# Patient Record
Sex: MALE | Race: WHITE | NOT HISPANIC OR LATINO | Employment: OTHER | URBAN - METROPOLITAN AREA
[De-identification: names, ages, dates, MRNs, and addresses within clinical notes are randomized per-mention and may not be internally consistent; named-entity substitution may affect disease eponyms.]

---

## 2022-09-14 ENCOUNTER — APPOINTMENT (OUTPATIENT)
Dept: GENERAL RADIOLOGY | Facility: HOSPITAL | Age: 77
End: 2022-09-14

## 2022-09-14 ENCOUNTER — HOSPITAL ENCOUNTER (EMERGENCY)
Facility: HOSPITAL | Age: 77
Discharge: HOME | End: 2022-09-14
Attending: EMERGENCY MEDICINE | Admitting: EMERGENCY MEDICINE

## 2022-09-14 VITALS
HEART RATE: 64 BPM | HEIGHT: 69 IN | TEMPERATURE: 98 F | WEIGHT: 205 LBS | RESPIRATION RATE: 16 BRPM | DIASTOLIC BLOOD PRESSURE: 80 MMHG | BODY MASS INDEX: 30.36 KG/M2 | SYSTOLIC BLOOD PRESSURE: 117 MMHG | OXYGEN SATURATION: 96 %

## 2022-09-14 DIAGNOSIS — I47.20 VENTRICULAR TACHYCARDIA: ICD-10-CM

## 2022-09-14 DIAGNOSIS — R04.0 EPISTAXIS: Primary | ICD-10-CM

## 2022-09-14 LAB
ALBUMIN SERPL-MCNC: 3.9 G/DL (ref 3.5–5.2)
ALBUMIN/GLOB SERPL: 1.3 G/DL
ALP SERPL-CCNC: 89 U/L (ref 39–117)
ALT SERPL W P-5'-P-CCNC: 14 U/L (ref 1–41)
ANION GAP SERPL CALCULATED.3IONS-SCNC: 7.7 MMOL/L (ref 5–15)
AST SERPL-CCNC: 22 U/L (ref 1–40)
BASOPHILS # BLD AUTO: 0.05 10*3/MM3 (ref 0–0.2)
BASOPHILS NFR BLD AUTO: 0.6 % (ref 0–1.5)
BILIRUB SERPL-MCNC: 0.5 MG/DL (ref 0–1.2)
BUN SERPL-MCNC: 20 MG/DL (ref 8–23)
BUN/CREAT SERPL: 14.9 (ref 7–25)
CALCIUM SPEC-SCNC: 8.7 MG/DL (ref 8.6–10.5)
CHLORIDE SERPL-SCNC: 103 MMOL/L (ref 98–107)
CO2 SERPL-SCNC: 26.3 MMOL/L (ref 22–29)
CREAT SERPL-MCNC: 1.34 MG/DL (ref 0.76–1.27)
DEPRECATED RDW RBC AUTO: 43.4 FL (ref 37–54)
EGFRCR SERPLBLD CKD-EPI 2021: 54.9 ML/MIN/1.73
EOSINOPHIL # BLD AUTO: 0.04 10*3/MM3 (ref 0–0.4)
EOSINOPHIL NFR BLD AUTO: 0.5 % (ref 0.3–6.2)
ERYTHROCYTE [DISTWIDTH] IN BLOOD BY AUTOMATED COUNT: 13.1 % (ref 12.3–15.4)
GLOBULIN UR ELPH-MCNC: 2.9 GM/DL
GLUCOSE SERPL-MCNC: 179 MG/DL (ref 65–99)
HCT VFR BLD AUTO: 46.8 % (ref 37.5–51)
HGB BLD-MCNC: 15.7 G/DL (ref 13–17.7)
IMM GRANULOCYTES # BLD AUTO: 0.04 10*3/MM3 (ref 0–0.05)
IMM GRANULOCYTES NFR BLD AUTO: 0.5 % (ref 0–0.5)
LYMPHOCYTES # BLD AUTO: 0.94 10*3/MM3 (ref 0.7–3.1)
LYMPHOCYTES NFR BLD AUTO: 11.3 % (ref 19.6–45.3)
MCH RBC QN AUTO: 30.7 PG (ref 26.6–33)
MCHC RBC AUTO-ENTMCNC: 33.5 G/DL (ref 31.5–35.7)
MCV RBC AUTO: 91.6 FL (ref 79–97)
MONOCYTES # BLD AUTO: 0.77 10*3/MM3 (ref 0.1–0.9)
MONOCYTES NFR BLD AUTO: 9.2 % (ref 5–12)
NEUTROPHILS NFR BLD AUTO: 6.49 10*3/MM3 (ref 1.7–7)
NEUTROPHILS NFR BLD AUTO: 77.9 % (ref 42.7–76)
NRBC BLD AUTO-RTO: 0 /100 WBC (ref 0–0.2)
PLATELET # BLD AUTO: 203 10*3/MM3 (ref 140–450)
PMV BLD AUTO: 10.7 FL (ref 6–12)
POTASSIUM SERPL-SCNC: 4.4 MMOL/L (ref 3.5–5.2)
PROT SERPL-MCNC: 6.8 G/DL (ref 6–8.5)
QT INTERVAL: 451 MS
RBC # BLD AUTO: 5.11 10*6/MM3 (ref 4.14–5.8)
SODIUM SERPL-SCNC: 137 MMOL/L (ref 136–145)
WBC NRBC COR # BLD: 8.33 10*3/MM3 (ref 3.4–10.8)

## 2022-09-14 PROCEDURE — 99284 EMERGENCY DEPT VISIT MOD MDM: CPT

## 2022-09-14 PROCEDURE — 36415 COLL VENOUS BLD VENIPUNCTURE: CPT

## 2022-09-14 PROCEDURE — 93005 ELECTROCARDIOGRAM TRACING: CPT | Performed by: EMERGENCY MEDICINE

## 2022-09-14 PROCEDURE — 93010 ELECTROCARDIOGRAM REPORT: CPT | Performed by: INTERNAL MEDICINE

## 2022-09-14 PROCEDURE — 71045 X-RAY EXAM CHEST 1 VIEW: CPT

## 2022-09-14 PROCEDURE — 80053 COMPREHEN METABOLIC PANEL: CPT | Performed by: EMERGENCY MEDICINE

## 2022-09-14 PROCEDURE — 85025 COMPLETE CBC W/AUTO DIFF WBC: CPT | Performed by: EMERGENCY MEDICINE

## 2022-09-14 RX ORDER — HYDRALAZINE HYDROCHLORIDE 25 MG/1
25 TABLET, FILM COATED ORAL 3 TIMES DAILY
COMMUNITY

## 2022-09-14 RX ORDER — SODIUM CHLORIDE 0.9 % (FLUSH) 0.9 %
10 SYRINGE (ML) INJECTION AS NEEDED
Status: DISCONTINUED | OUTPATIENT
Start: 2022-09-14 | End: 2022-09-14 | Stop reason: HOSPADM

## 2022-09-14 RX ORDER — OXYMETAZOLINE HYDROCHLORIDE 0.05 G/100ML
1 SPRAY NASAL ONCE
Status: COMPLETED | OUTPATIENT
Start: 2022-09-14 | End: 2022-09-14

## 2022-09-14 RX ORDER — ISOSORBIDE DINITRATE 30 MG/1
30 TABLET ORAL
COMMUNITY

## 2022-09-14 RX ADMIN — Medication 1 SPRAY: at 09:40

## 2022-09-14 NOTE — ED NOTES
Dr Cheung office called, pacemaker staff member to call back. [facility] called,  Rep Brennen Bear to call back

## 2022-09-14 NOTE — ED PROVIDER NOTES
Subjective   PIT    History of Present Illness  Patient presents with 2 complaints.  Patient said he was sleeping in bed this morning and he felt something in his chest which felt like a heart hit.  Patient does have a pacemaker/defibrillator.  Patient denies any previous episodes.  Patient has otherwise been in good health.  Patient is having no chest pain or trouble breathing.  [facility] and he [facility] Dr. Ortega.  Patient's other complaint is a left nostril nosebleed.  1 month ago patient had to have ablation by ENT due to a years worth of nosebleeds.  Patient says he is otherwise been doing well until this morning when it started spontaneously.  Patient denies any trauma or upper respiratory infection.  Patient is on blood thinners.  Patient was able to hold pressure and keep it from stopping but then once he releases pressure it will start again.  No therapy taken today.  Multiple previous episodes.  Patient presents with 2 complaints.  Patient said he was sleeping in bed this morning and he felt something in his chest which felt like a heart hit.  Patient does have a pacemaker/defibrillator.  Patient denies any previous episodes.  Patient has otherwise been in good health.  Patient is having no chest pain or trouble breathing.  [facility] is [facility] and he sees Dr. Ortega.  Patient's other complaint is a left nostril nosebleed.  1 month ago patient had to have ablation by ENT due to a years worth of nosebleeds.  Patient says he is otherwise been doing well until this morning when it started spontaneously.  Patient denies any trauma or upper respiratory infection.  Patient is on blood thinners.  Patient was able to hold pressure and keep it from stopping but then once he releases pressure it will start again.  No therapy taken today.  Multiple previous episodes.          Review of Systems   Constitutional: Negative.    HENT: Positive for nosebleeds.    Eyes: Negative.    Respiratory: Negative.     Cardiovascular: Negative for chest pain and palpitations.   Gastrointestinal: Negative.    Musculoskeletal: Negative.    Skin: Negative.    Neurological: Negative.    Hematological: Negative.        Past Medical History:   Diagnosis Date   • Atrial fibrillation (HCC)    • Cancer (HCC)    • Hyperlipidemia    • Hypertension    • Stroke (HCC)        Allergies   Allergen Reactions   • Lisinopril Swelling       Past Surgical History:   Procedure Laterality Date   • CARDIAC ABLATION     • PACEMAKER IMPLANTATION         History reviewed. No pertinent family history.    Social History     Socioeconomic History   • Marital status:    Tobacco Use   • Smoking status: Never Smoker           Objective   Physical Exam  Constitutional:       Appearance: Normal appearance.   HENT:      Head: Normocephalic and atraumatic.      Right Ear: External ear normal.      Left Ear: External ear normal.      Nose:      Left Nostril: Epistaxis present.      Mouth/Throat:      Mouth: Mucous membranes are moist.      Pharynx: Oropharynx is clear.   Eyes:      Extraocular Movements: Extraocular movements intact.      Conjunctiva/sclera: Conjunctivae normal.      Pupils: Pupils are equal, round, and reactive to light.   Cardiovascular:      Rate and Rhythm: Normal rate and regular rhythm.      Pulses: Normal pulses.      Heart sounds: Normal heart sounds.   Pulmonary:      Effort: Pulmonary effort is normal.      Breath sounds: Normal breath sounds.   Musculoskeletal:         General: No swelling or tenderness.      Cervical back: Normal range of motion.   Lymphadenopathy:      Cervical: No cervical adenopathy.   Skin:     General: Skin is warm and dry.   Neurological:      General: No focal deficit present.      Mental Status: He is alert.         ECG 12 Lead      Date/Time: 9/14/2022 9:43 AM  Performed by: Sterling Iverson MD  Authorized by: Sterling Iverson MD   Interpreted by physician  Comments: Rate of 70, biventricular paced  rhythm, no acute ST abnormalities.  No previous EKGs for comparison.                 ED Course                                         MDM  Number of Diagnoses or Management Options  Diagnosis management comments: 1124 clamp was been removed.  Patient did receive a few sprays of Afrin and then the clamp was reapplied.  There is no active bleeding as of now.  Patient will be observed for the next 30 minutes.  Also the pacemaker report came through and patient had had a few runs that was overdrive paced but he did have a defibrillator firing around 4:00.  Patient swears that it was not until 7:00 that he felt it but there is no report of any type of activity around 7 AM.  Dr. Cochran has been paged for plan of action.  Currently waiting on a call back.  Patient remained stable without any new complaints.    1215 discussed patient with Dr. Cochran and reviewed the 6 episodes plus the 1 shock that was given.  He did not want to change patient's medications.  He understood the patient's blood work was unremarkable and he just wanted patient to see him in clinic this week or the beginning of next week.       Amount and/or Complexity of Data Reviewed  Clinical lab tests: ordered and reviewed  Tests in the radiology section of CPT®: ordered and reviewed  Independent visualization of images, tracings, or specimens: yes        Final diagnoses:   Epistaxis   Ventricular tachycardia (HCC)       ED Disposition  ED Disposition     ED Disposition   Discharge    Condition   Stable    Comment   --             Kannan Ortega MD  [address] [facility]  [place] [address]  [phone]    Schedule an appointment as soon as possible for a visit            Medication List      No changes were made to your prescriptions during this visit.          Sterling Iverson MD  09/14/22 0980       Sterling Iverson MD  09/14/22 0956       Sterling Iverson MD  09/14/22 1125       Sterling Iverson MD  09/14/22 1242

## 2022-09-14 NOTE — ED NOTES
William from Dr Ortega's office called, stated they got a recording of 18 sec vfib at 0404 this am. ATP x6 and pacing did not work which then delivered shock of 550V, dr flowers notofoed

## 2022-10-20 ENCOUNTER — HOSPITAL ENCOUNTER (EMERGENCY)
Facility: HOSPITAL | Age: 77
Discharge: HOME | End: 2022-10-20
Attending: EMERGENCY MEDICINE | Admitting: EMERGENCY MEDICINE

## 2022-10-20 ENCOUNTER — APPOINTMENT (OUTPATIENT)
Dept: GENERAL RADIOLOGY | Facility: HOSPITAL | Age: 77
End: 2022-10-20

## 2022-10-20 VITALS
TEMPERATURE: 97.3 F | HEART RATE: 60 BPM | DIASTOLIC BLOOD PRESSURE: 86 MMHG | BODY MASS INDEX: 29.77 KG/M2 | OXYGEN SATURATION: 96 % | RESPIRATION RATE: 14 BRPM | WEIGHT: 201 LBS | SYSTOLIC BLOOD PRESSURE: 137 MMHG | HEIGHT: 69 IN

## 2022-10-20 DIAGNOSIS — Z71.1 FEARED CONDITION NOT DEMONSTRATED: Primary | ICD-10-CM

## 2022-10-20 DIAGNOSIS — Z45.02 ENCOUNTER FOR ASSESSMENT OF AUTOMATIC IMPLANTABLE CARDIOVERTER-DEFIBRILLATOR (AICD): ICD-10-CM

## 2022-10-20 LAB
ALBUMIN SERPL-MCNC: 4.3 G/DL (ref 3.5–5.2)
ALBUMIN/GLOB SERPL: 1.4 G/DL
ALP SERPL-CCNC: 95 U/L (ref 39–117)
ALT SERPL W P-5'-P-CCNC: 15 U/L (ref 1–41)
ANION GAP SERPL CALCULATED.3IONS-SCNC: 11.7 MMOL/L (ref 5–15)
AST SERPL-CCNC: 19 U/L (ref 1–40)
BASOPHILS # BLD AUTO: 0.05 10*3/MM3 (ref 0–0.2)
BASOPHILS NFR BLD AUTO: 0.5 % (ref 0–1.5)
BILIRUB SERPL-MCNC: 0.6 MG/DL (ref 0–1.2)
BUN SERPL-MCNC: 20 MG/DL (ref 8–23)
BUN/CREAT SERPL: 13.8 (ref 7–25)
CALCIUM SPEC-SCNC: 9.4 MG/DL (ref 8.6–10.5)
CHLORIDE SERPL-SCNC: 100 MMOL/L (ref 98–107)
CO2 SERPL-SCNC: 27.3 MMOL/L (ref 22–29)
CREAT SERPL-MCNC: 1.45 MG/DL (ref 0.76–1.27)
DEPRECATED RDW RBC AUTO: 44.3 FL (ref 37–54)
EGFRCR SERPLBLD CKD-EPI 2021: 49.9 ML/MIN/1.73
EOSINOPHIL # BLD AUTO: 0.06 10*3/MM3 (ref 0–0.4)
EOSINOPHIL NFR BLD AUTO: 0.6 % (ref 0.3–6.2)
ERYTHROCYTE [DISTWIDTH] IN BLOOD BY AUTOMATED COUNT: 13.2 % (ref 12.3–15.4)
GLOBULIN UR ELPH-MCNC: 3 GM/DL
GLUCOSE SERPL-MCNC: 138 MG/DL (ref 65–99)
HCT VFR BLD AUTO: 48.5 % (ref 37.5–51)
HGB BLD-MCNC: 16.2 G/DL (ref 13–17.7)
IMM GRANULOCYTES # BLD AUTO: 0.02 10*3/MM3 (ref 0–0.05)
IMM GRANULOCYTES NFR BLD AUTO: 0.2 % (ref 0–0.5)
LYMPHOCYTES # BLD AUTO: 2.17 10*3/MM3 (ref 0.7–3.1)
LYMPHOCYTES NFR BLD AUTO: 20.9 % (ref 19.6–45.3)
MAGNESIUM SERPL-MCNC: 2 MG/DL (ref 1.6–2.4)
MCH RBC QN AUTO: 30.9 PG (ref 26.6–33)
MCHC RBC AUTO-ENTMCNC: 33.4 G/DL (ref 31.5–35.7)
MCV RBC AUTO: 92.6 FL (ref 79–97)
MONOCYTES # BLD AUTO: 0.88 10*3/MM3 (ref 0.1–0.9)
MONOCYTES NFR BLD AUTO: 8.5 % (ref 5–12)
NEUTROPHILS NFR BLD AUTO: 69.3 % (ref 42.7–76)
NEUTROPHILS NFR BLD AUTO: 7.19 10*3/MM3 (ref 1.7–7)
NRBC BLD AUTO-RTO: 0 /100 WBC (ref 0–0.2)
PLATELET # BLD AUTO: 200 10*3/MM3 (ref 140–450)
PMV BLD AUTO: 10.7 FL (ref 6–12)
POTASSIUM SERPL-SCNC: 3.9 MMOL/L (ref 3.5–5.2)
PROT SERPL-MCNC: 7.3 G/DL (ref 6–8.5)
QT INTERVAL: 499 MS
RBC # BLD AUTO: 5.24 10*6/MM3 (ref 4.14–5.8)
SODIUM SERPL-SCNC: 139 MMOL/L (ref 136–145)
TROPONIN T SERPL-MCNC: <0.01 NG/ML (ref 0–0.03)
TROPONIN T SERPL-MCNC: <0.01 NG/ML (ref 0–0.03)
TSH SERPL DL<=0.05 MIU/L-ACNC: 9.42 UIU/ML (ref 0.27–4.2)
WBC NRBC COR # BLD: 10.37 10*3/MM3 (ref 3.4–10.8)

## 2022-10-20 PROCEDURE — 80053 COMPREHEN METABOLIC PANEL: CPT | Performed by: EMERGENCY MEDICINE

## 2022-10-20 PROCEDURE — 99283 EMERGENCY DEPT VISIT LOW MDM: CPT

## 2022-10-20 PROCEDURE — 93010 ELECTROCARDIOGRAM REPORT: CPT | Performed by: INTERNAL MEDICINE

## 2022-10-20 PROCEDURE — 71046 X-RAY EXAM CHEST 2 VIEWS: CPT

## 2022-10-20 PROCEDURE — 84484 ASSAY OF TROPONIN QUANT: CPT | Performed by: EMERGENCY MEDICINE

## 2022-10-20 PROCEDURE — 83735 ASSAY OF MAGNESIUM: CPT | Performed by: EMERGENCY MEDICINE

## 2022-10-20 PROCEDURE — 36415 COLL VENOUS BLD VENIPUNCTURE: CPT

## 2022-10-20 PROCEDURE — 93005 ELECTROCARDIOGRAM TRACING: CPT | Performed by: EMERGENCY MEDICINE

## 2022-10-20 PROCEDURE — 85025 COMPLETE CBC W/AUTO DIFF WBC: CPT | Performed by: EMERGENCY MEDICINE

## 2022-10-20 PROCEDURE — 84443 ASSAY THYROID STIM HORMONE: CPT | Performed by: EMERGENCY MEDICINE

## 2022-10-20 RX ORDER — AMIODARONE HYDROCHLORIDE 200 MG/1
200 TABLET ORAL DAILY
COMMUNITY

## 2022-10-20 NOTE — ED PROVIDER NOTES
Subjective   History of Present Illness  77-year-old male presents after he was woken from sleep by his AICD shocked him twice in close succession.  Patient states he felt a little anxious and jittery after being woken up this way but otherwise feels fine.  Denies any chest pain or shortness of breath.  States he has some chronic back pain which is no different than his norm.  Felt well prior to going to bed tonight.  Eating and drinking normally.  Was here about a month ago for similar episode.  Reports that he followed up with cardiology recently and was started on amiodarone which she has taken for about 1 week now.  Patient is on Eliquis for history of atrial fibrillation/flutter.        Review of Systems   All other systems reviewed and are negative.      Past Medical History:   Diagnosis Date   • Atrial fibrillation (HCC)    • Cancer (HCC)    • Hyperlipidemia    • Hypertension    • Stroke (HCC)        Allergies   Allergen Reactions   • Lisinopril Swelling       Past Surgical History:   Procedure Laterality Date   • CARDIAC ABLATION     • PACEMAKER IMPLANTATION         History reviewed. No pertinent family history.    Social History     Socioeconomic History   • Marital status:    Tobacco Use   • Smoking status: Never           Objective   Physical Exam  Constitutional:       General: He is not in acute distress.     Appearance: Normal appearance. He is not ill-appearing, toxic-appearing or diaphoretic.   HENT:      Head: Normocephalic and atraumatic.      Mouth/Throat:      Mouth: Mucous membranes are moist.      Pharynx: Oropharynx is clear.   Eyes:      Extraocular Movements: Extraocular movements intact.      Pupils: Pupils are equal, round, and reactive to light.   Cardiovascular:      Rate and Rhythm: Normal rate and regular rhythm.      Pulses: Normal pulses.      Heart sounds: Normal heart sounds.   Pulmonary:      Effort: Pulmonary effort is normal. No respiratory distress.      Breath sounds:  Normal breath sounds.   Abdominal:      General: There is no distension.      Palpations: Abdomen is soft.      Tenderness: There is no abdominal tenderness.   Musculoskeletal:         General: No swelling, tenderness, deformity or signs of injury. Normal range of motion.      Right lower leg: No edema.      Left lower leg: No edema.   Skin:     General: Skin is warm and dry.   Neurological:      General: No focal deficit present.      Mental Status: He is alert and oriented to person, place, and time. Mental status is at baseline.   Psychiatric:         Mood and Affect: Mood normal.         Behavior: Behavior normal.         Thought Content: Thought content normal.         Judgment: Judgment normal.         Procedures           ED Course  ED Course as of 10/20/22 0455   u Oct 20, 2022   0223 EKG obtained at 0216 shows ventricular paced rhythm, rate 61, PVC [TD]   0453 Pt overall well appearing. Was able to speak to [facility] who reports no AICD discharge or arhythmia tonight on interpretation. Workup here including troponin x2 is unremarkable. Patient hasn't had CP at all and other than believing he was shocked has no complaints, so do not feel further intervention or workup is indicated.  [TD]      ED Course User Index  [TD] Frantz Sage MD                                           [facility]    Final diagnoses:   Feared condition not demonstrated   Encounter for assessment of automatic implantable cardioverter-defibrillator (AICD)       ED Disposition  ED Disposition     ED Disposition   Discharge    Condition   Stable    Comment   --             Kannan Ortega MD  [address]  [address] [address]  [phone]    Call today           Medication List      No changes were made to your prescriptions during this visit.          Frantz Sage MD  10/20/22 0455

## 2023-05-26 ENCOUNTER — HOSPITAL ENCOUNTER (EMERGENCY)
Facility: HOSPITAL | Age: 78
Discharge: HOME | End: 2023-05-26
Attending: EMERGENCY MEDICINE
Payer: MEDICARE

## 2023-05-26 VITALS
TEMPERATURE: 97.4 F | SYSTOLIC BLOOD PRESSURE: 128 MMHG | OXYGEN SATURATION: 97 % | HEIGHT: 69 IN | BODY MASS INDEX: 29.7 KG/M2 | RESPIRATION RATE: 18 BRPM | WEIGHT: 200.5 LBS | DIASTOLIC BLOOD PRESSURE: 88 MMHG | HEART RATE: 60 BPM

## 2023-05-26 DIAGNOSIS — R04.0 LEFT-SIDED NOSEBLEED: Primary | ICD-10-CM

## 2023-05-26 PROCEDURE — 99283 EMERGENCY DEPT VISIT LOW MDM: CPT

## 2023-05-26 RX ADMIN — PHENYLEPHRINE HYDROCHLORIDE 2 SPRAY: 1 SPRAY NASAL at 18:54

## 2023-05-26 RX ADMIN — SILVER NITRATE APPLICATORS 1 APPLICATION: 25; 75 STICK TOPICAL at 21:06

## 2023-05-26 NOTE — ED PROVIDER NOTES
"Subjective     History provided by:  Patient    History of Present Illness    · Chief complaint: Nosebleed    · Location: Left nostril    · Quality/Severity: Heavy bleeding from the left nostril.    · Timing/Onset: Started at 16:50 this afternoon.    · Modifying Factors: The patient is anticoagulated with Eliquis for A-fib.  He held pressure to his nose he states for an hour.    · Associated symptoms: He has a chronic runny nose.    · Narrative: The patient is a 77-year-old white male who is anticoagulated with Eliquis for A-fib.  He developed a left-sided nosebleed at 16: 50 this afternoon.  He is applied pressure for the last hour.  He states he has had a history of previous nosebleeds and at the [facility] required an ENT to cauterize it.    Review of Systems  Past Medical History:   Diagnosis Date   • Atrial fibrillation    • Cancer    • Hyperlipidemia    • Hypertension    • Stroke      /88   Pulse 60   Temp 97.4 °F (36.3 °C) (Oral)   Resp 18   Ht 175.3 cm (69\")   Wt 90.9 kg (200 lb 8 oz)   SpO2 97%   BMI 29.61 kg/m²     Past Medical History:   Diagnosis Date   • Atrial fibrillation    • Cancer    • Hyperlipidemia    • Hypertension    • Stroke        Allergies   Allergen Reactions   • Lisinopril Swelling       Past Surgical History:   Procedure Laterality Date   • CARDIAC ABLATION     • PACEMAKER IMPLANTATION         No family history on file.    Social History     Socioeconomic History   • Marital status:    Tobacco Use   • Smoking status: Never           Objective   Physical Exam  Vitals and nursing note reviewed.   Constitutional:       General: He is not in acute distress.     Appearance: Normal appearance. He is not ill-appearing, toxic-appearing or diaphoretic.      Comments: The patient appears healthy in no acute distress.  Review of his vital signs: He is afebrile and all his vital signs are within normal limits.   HENT:      Head: Normocephalic and atraumatic.      Nose:      " Comments: Bleeding from the left nostril.  Eyes:      General: No scleral icterus.     Conjunctiva/sclera: Conjunctivae normal.   Neurological:      General: No focal deficit present.      Mental Status: He is alert and oriented to person, place, and time.      Cranial Nerves: No cranial nerve deficit.      Sensory: No sensory deficit.      Motor: No weakness.   Psychiatric:         Mood and Affect: Mood normal.         Behavior: Behavior normal.         Thought Content: Thought content normal.         Judgment: Judgment normal.         Procedures           ED Course  ED Course as of 05/26/23 2315   Fri May 26, 2023   2312 I initially inserted Pablito-Synephrine soaked cotton pledgets in the left nostril and held pressure for about 30 minutes with a nasal clip.  On reinspection I saw dried blood and oozing of slight blood.  The process for repeated.  Afterwards I noticed an area on the Travis box plexus that look like an AV malformation that might be the source of the bleeding.  I cauterized that spot with silver nitrate and the patient started bleeding briskly.  We once again tried Pablito-Synephrine soaked cotton pledgets and nasal clip for about 45 minutes which would slow the bleeding to a slight trickle.  At that point I packed the patient's left nostril with a rapid Rhino rocket.  Bleeding was stopped.  The patient was instructed to leave the packing in place for 24 hours.  As it is a holiday weekend ENT office hours will not be available for 4 days.  The patient is normally seen at the [facility], and states that he has had his nose packed in there ER before by ENT.  ENT is unavailable at our facility.  I recommended should he have a repeat current bleeding that [facility].  I recommended that he hold his evening dose of Eliquis tonight to allow the bleeding to slow.  He is to resume the Eliquis tomorrow morning. [TP]      ED Course User Index  [TP] Frantz Mendosa MD                                            Medical Decision Making  Left-sided nosebleed: acute illness or injury  Risk  OTC drugs.  Prescription drug management.          Final diagnoses:   Left-sided nosebleed       ED Disposition  ED Disposition     ED Disposition   Discharge    Condition   Stable    Comment   --             [facility] [place] [address] [address]  [facility] [address]  [phone]  Go to   If symptoms worsen         Medication List      No changes were made to your prescriptions during this visit.         Labs Reviewed - No data to display  No orders to display          Medication List      No changes were made to your prescriptions during this visit.              Frantz Mendosa MD  05/26/23 8676

## 2023-05-27 NOTE — DISCHARGE INSTRUCTIONS
Hold your Eliquis tonight.  Leave the packing in until tomorrow night.  [facility] as ENT consultation is not available at this facility.